# Patient Record
Sex: MALE | Race: WHITE | Employment: FULL TIME | URBAN - METROPOLITAN AREA
[De-identification: names, ages, dates, MRNs, and addresses within clinical notes are randomized per-mention and may not be internally consistent; named-entity substitution may affect disease eponyms.]

---

## 2019-01-11 ENCOUNTER — HOSPITAL ENCOUNTER (OUTPATIENT)
Age: 49
Discharge: HOME | End: 2019-01-13
Payer: COMMERCIAL

## 2019-01-11 ENCOUNTER — HOSPITAL ENCOUNTER (OUTPATIENT)
Dept: GENERAL RADIOLOGY | Age: 49
Discharge: HOME | End: 2019-01-13
Payer: COMMERCIAL

## 2019-01-11 DIAGNOSIS — N20.0 CALCULUS OF KIDNEY: ICD-10-CM

## 2019-01-11 LAB — PROSTATE SPECIFIC ANTIGEN: 1.23 NG/ML (ref 0–4)

## 2019-01-11 PROCEDURE — 74018 RADEX ABDOMEN 1 VIEW: CPT

## 2019-01-11 PROCEDURE — G0103 PSA SCREENING: HCPCS

## 2020-01-22 ENCOUNTER — HOSPITAL ENCOUNTER (OUTPATIENT)
Age: 50
Discharge: HOME | End: 2020-01-24
Payer: COMMERCIAL

## 2020-01-22 LAB — PROSTATE SPECIFIC ANTIGEN: 1.18 NG/ML (ref 0–4)

## 2020-01-22 PROCEDURE — G0103 PSA SCREENING: HCPCS

## 2020-11-13 ENCOUNTER — OFFICE VISIT (OUTPATIENT)
Dept: CHIROPRACTIC MEDICINE | Age: 50
End: 2020-11-13
Payer: COMMERCIAL

## 2020-11-13 VITALS — TEMPERATURE: 98.2 F | OXYGEN SATURATION: 97 % | HEART RATE: 72 BPM

## 2020-11-13 PROCEDURE — G8428 CUR MEDS NOT DOCUMENT: HCPCS | Performed by: CHIROPRACTOR

## 2020-11-13 PROCEDURE — G8421 BMI NOT CALCULATED: HCPCS | Performed by: CHIROPRACTOR

## 2020-11-13 PROCEDURE — 1036F TOBACCO NON-USER: CPT | Performed by: CHIROPRACTOR

## 2020-11-13 PROCEDURE — G8484 FLU IMMUNIZE NO ADMIN: HCPCS | Performed by: CHIROPRACTOR

## 2020-11-13 PROCEDURE — 99212 OFFICE O/P EST SF 10 MIN: CPT | Performed by: CHIROPRACTOR

## 2020-11-13 NOTE — PROGRESS NOTES
Daniel Alarcon : [birth year] Sex: male  Age: 52 y.o. Chief Complaint   Patient presents with    Hand Pain     right       HPI:   Right hand pain  Has been several months since I last saw Justin Alberts. He has been doing well in the interim. Will have some mild neck or back symptoms which he is able to take care of himself. However approximately 2 weeks ago he was lifting and moving some boxes when he felt immediate pain identified near the fourth digit right hand MCP joint. He denies any immediate swelling. He states he still seems to have full function of the hand, but he will get some tingling in various parts of his hand. He also notices the tingling in the morning upon waking. States he will shake it out and it goes away. He does have some symptoms traveling to the forearm, but nothing proximal to the elbow. Denies any neck pain. He believes that this fourth digit may be the one that he injured a couple of years ago, believes he jammed it doing some exercising/wall ball and wore a splint for a few days with relief. Current Outpatient Medications:     brimonidine (ALPHAGAN) 0.2 % ophthalmic solution, INSTILL 1 GTT INTO OU Q 12 H, Disp: , Rfl: 3    LUMIGAN 0.01 % SOLN ophthalmic drops, INSTILL 1 GTT INTO OU Q NIGHT, Disp: , Rfl: 3    allopurinol (ZYLOPRIM) 100 MG tablet, Take 100 mg by mouth daily. , Disp: , Rfl:     Exam:   Vitals:    20 0916   Pulse: 72   Temp: 98.2 °F (36.8 °C)   SpO2: 97%     He appears well. No apparent distress. Alert and oriented x3. Cervical active range of motion are well-preserved. His no midline cervical pain. Cervical compression, distraction, foraminal compression and maximum cervical rotatory compression testing are all negative. Right arm squeeze testing reproduces some mild symptoms into the hand. Phalen's and reverse Phalen's are both negative. He is neurovascularly intact to the upper extremities. Momo's and Tromner's are absent.   He displays full active motion of the digits of the right hand and wrist.  Resisted flexion of the fourth digit causes immediate pain and swelling near the MCP joint and into the palmar surface of the right hand. Jackson Goldman was seen today for hand pain. Diagnoses and all orders for this visit:    Pain in joint of right hand  -     XR HAND RIGHT (MIN 3 VIEWS); Future  -     External Referral To Orthopedic Surgery        Treatment Plan: Ordering x-rays of his right hand today to rule out any underlying fracture. However I believe this is an acute tendinopathy involving the finger flexor (FDP). Recommend he see a hand specialist for further evaluation. He has a pre existing relationship with Dr. Cyril Cevallos at [facility], so I will refer him there at his request.      Xray results pending.   Will contact him with results when available      Seen By:  Claudette Andrews

## 2020-12-08 ENCOUNTER — OFFICE VISIT (OUTPATIENT)
Dept: CHIROPRACTIC MEDICINE | Age: 50
End: 2020-12-08
Payer: COMMERCIAL

## 2020-12-08 VITALS — HEART RATE: 71 BPM | TEMPERATURE: 96.5 F | OXYGEN SATURATION: 98 % | RESPIRATION RATE: 16 BRPM

## 2020-12-08 PROCEDURE — G0283 ELEC STIM OTHER THAN WOUND: HCPCS | Performed by: CHIROPRACTOR

## 2020-12-08 PROCEDURE — 98940 CHIROPRACT MANJ 1-2 REGIONS: CPT | Performed by: CHIROPRACTOR

## 2020-12-08 NOTE — PROGRESS NOTES
Daniel Alarcon :  Sex: male  Age: 52 y.o. Chief Complaint   Patient presents with    Neck Pain       HPI:   Zoë Lockwood returns today. He did see Dr. Polina Santacruz regarding his hand. Diagnosed with a sprain of the finger. Also noted some carpal tunnel syndrome of the wrist which they are working up. Today, he is having some left-sided neck pain. No injury. It has not prevented his activities. He started doing his HEP exercises again and they seem to help. He is better when he is active or at the gym. Worse in the evenings. He does not have any true symptoms down the arm. But, he still gets some tingling in his fingertips at times. But no continuous pain from the neck down the arm. Current Outpatient Medications:     brimonidine (ALPHAGAN) 0.2 % ophthalmic solution, INSTILL 1 GTT INTO OU Q 12 H, Disp: , Rfl: 3    LUMIGAN 0.01 % SOLN ophthalmic drops, INSTILL 1 GTT INTO OU Q NIGHT, Disp: , Rfl: 3    allopurinol (ZYLOPRIM) 100 MG tablet, Take 100 mg by mouth daily. , Disp: , Rfl:     Exam:   Vitals:    20 1320   Pulse: 71   Resp: 16   Temp: 96.5 °F (35.8 °C)   SpO2: 98%     He appears well. No apparent distress. Alert and oriented x3. Cervical active range of motion are mildly limited only in all planes. He does have some endrange discomfort in bilateral rotation. He is neurovascularly intact throughout the upper extremities. Momo's and Tromner's are absent. Cervical compression reproduces axial neck pain only. Foraminal compression testing reproduces axial neck pain as well bilaterally without radiation of symptoms to the extremities. Maximum cervical rotatory compression testing is negative bilaterally. Neurovascular compression testing and arm squeeze tests are negative bilaterally. The neck is supple and nontender in the midline. There are hypertonic and tender fibers noted today in the cervical and thoracic paraspinal muscles.   Trigger point the left trapezius today. Joint fixation is noted with motion screening at T2-4. Jonn Simons was seen today for neck pain. Diagnoses and all orders for this visit:    Cervicalgia    Cervical radiculopathy at C6        Treatment Plan: He is scheduled for an EMG tomorrow regarding the carpal tunnel syndrome. This will also help us differentiate true cervical radiculopathy from scleratogenous pain. Today, EMS with heat to the cervical region for 15 minutes to address muscle spasm/hypertension and alleviate pain. Diversified manipulation to the affected segments. Tolerated well. We will see what the EMG shows tomorrow. Proceed from there.         Seen By:  Bettye Zaldivar DC

## 2020-12-11 ENCOUNTER — OFFICE VISIT (OUTPATIENT)
Dept: CHIROPRACTIC MEDICINE | Age: 50
End: 2020-12-11
Payer: COMMERCIAL

## 2020-12-11 VITALS
RESPIRATION RATE: 16 BRPM | HEART RATE: 67 BPM | TEMPERATURE: 97.2 F | WEIGHT: 190 LBS | OXYGEN SATURATION: 97 % | HEIGHT: 70 IN | BODY MASS INDEX: 27.2 KG/M2

## 2020-12-11 PROCEDURE — 98940 CHIROPRACT MANJ 1-2 REGIONS: CPT | Performed by: CHIROPRACTOR

## 2020-12-11 PROCEDURE — 99999 PR OFFICE/OUTPT VISIT,PROCEDURE ONLY: CPT | Performed by: CHIROPRACTOR

## 2020-12-11 PROCEDURE — G0283 ELEC STIM OTHER THAN WOUND: HCPCS | Performed by: CHIROPRACTOR

## 2020-12-11 NOTE — PROGRESS NOTES
Daniel Alarcon :  Sex: male  Age: 52 y.o. Chief Complaint   Patient presents with    Neck Pain       HPI:   Pain is has slightly improved. On average, pain is perceived as moderate (4 pain scale). He did have an EMG the other day which showed right-sided carpal tunnel syndrome, but no issues in the left. States that spot in the left side of his neck is still there but doing better. He will occasionally have some spasming or pain near the left elbow or biceps area, but nothing distal.        Current Outpatient Medications:     brimonidine (ALPHAGAN) 0.2 % ophthalmic solution, INSTILL 1 GTT INTO OU Q 12 H, Disp: , Rfl: 3    LUMIGAN 0.01 % SOLN ophthalmic drops, INSTILL 1 GTT INTO OU Q NIGHT, Disp: , Rfl: 3    allopurinol (ZYLOPRIM) 100 MG tablet, Take 100 mg by mouth daily. , Disp: , Rfl:     Exam:   Vitals:    20 0947   Pulse: 67   Resp: 16   Temp: 97.2 °F (36.2 °C)   SpO2: 97%       There is no midline pain. He does have tenderness over the first rib on the left. Some muscle spasming here as well. There are hypertonic and tender fibers noted today in the cervical and thoracic paraspinal muscles. Joint fixation is noted with motion screening at C7-T1, T2-4. Seng Comment was seen today for neck pain. Diagnoses and all orders for this visit:    Cervicalgia    Pain in joint of right hand    Panniculitis affecting regions of neck and back, thoracic region        Treatment Plan:  EMS with heat to the cervicothoracic region for 15 minutes to address muscle spasm/hypertension and alleviate pain. Diversified manipulation of the listed segments. Tolerated well. Introduced cervical extension mobilization using a towel today. Wanted to try 1-2 sets of 10/day and see if this changes anything. He should stop should symptoms worsen. Otherwise keep up with his HEP and exercising as he wishes.   I will see him back next week for further care      Seen By:  Sara Roberts

## 2020-12-17 ENCOUNTER — OFFICE VISIT (OUTPATIENT)
Dept: CHIROPRACTIC MEDICINE | Age: 50
End: 2020-12-17
Payer: COMMERCIAL

## 2020-12-17 VITALS
HEART RATE: 80 BPM | TEMPERATURE: 97.8 F | HEIGHT: 70 IN | RESPIRATION RATE: 14 BRPM | BODY MASS INDEX: 27.2 KG/M2 | OXYGEN SATURATION: 97 % | WEIGHT: 190 LBS

## 2020-12-17 PROCEDURE — 97140 MANUAL THERAPY 1/> REGIONS: CPT | Performed by: CHIROPRACTOR

## 2020-12-17 PROCEDURE — G0283 ELEC STIM OTHER THAN WOUND: HCPCS | Performed by: CHIROPRACTOR

## 2020-12-17 NOTE — PROGRESS NOTES
Daniel Alarcon :  Sex: male  Age: 52 y.o. Chief Complaint   Patient presents with    Neck Pain       HPI:   Overall he is doing better. But he still has times where the neck pain will bother him and travel to the left lateral upper arm, biceps region. This particularly happens when he extends his neck backwards. He states sometimes when he does his neck exercises he will get it to the left arm as well. It has not limited his ability to perform tasks, exercise, etc.    He does see Dr. Devon Webster next week for follow-up of the right wrist/hand pain. Current Outpatient Medications:     brimonidine (ALPHAGAN) 0.2 % ophthalmic solution, INSTILL 1 GTT INTO OU Q 12 H, Disp: , Rfl: 3    LUMIGAN 0.01 % SOLN ophthalmic drops, INSTILL 1 GTT INTO OU Q NIGHT, Disp: , Rfl: 3    allopurinol (ZYLOPRIM) 100 MG tablet, Take 100 mg by mouth daily. , Disp: , Rfl:     Exam:   Vitals:    20 0846   Pulse: 80   Resp: 14   Temp: 97.8 °F (36.6 °C)   SpO2: 97%       Cervical extension and extension with overpressure increases left upper arm symptoms. As does maximum cervical rotatory compression testing to the left. No midline cervical pain. There are hypertonic and tender fibers noted today in the cervical and thoracic paraspinal muscles. Trigger points left trapezius, left scalenus medius    Yesy Karena was seen today for neck pain. Diagnoses and all orders for this visit:    Cervical radiculopathy at C6    Panniculitis affecting regions of neck and back, thoracic region    Muscle spasm of back        Treatment Plan:  EMS with heat to the cervicothoracic region for 15 minutes to address muscle spasm/hypertension and alleviate pain. Manual therapy to the cervicothoracic region using techniques including IASTM (JOHN), trigger point therapy were performed today for 12 minutes.   9:07-919am Discussed physical medicine visithe states since he is getting better he would like to try a few more visits before we go down the road again.       Seen By:  Jenny Lorenz DC

## 2020-12-22 ENCOUNTER — OFFICE VISIT (OUTPATIENT)
Dept: CHIROPRACTIC MEDICINE | Age: 50
End: 2020-12-22
Payer: COMMERCIAL

## 2020-12-22 VITALS
HEART RATE: 71 BPM | OXYGEN SATURATION: 97 % | HEIGHT: 70 IN | RESPIRATION RATE: 14 BRPM | TEMPERATURE: 97.5 F | BODY MASS INDEX: 27.2 KG/M2 | WEIGHT: 190 LBS

## 2020-12-22 PROCEDURE — G0283 ELEC STIM OTHER THAN WOUND: HCPCS | Performed by: CHIROPRACTOR

## 2020-12-22 PROCEDURE — 97140 MANUAL THERAPY 1/> REGIONS: CPT | Performed by: CHIROPRACTOR

## 2020-12-22 NOTE — PROGRESS NOTES
Daniel Alarcon :  Sex: male  Age: 52 y.o. Chief Complaint   Patient presents with    Neck Pain       HPI:   Pain is has improved. Pain is mild in the left cervicothoracic region, but he still gets some ill-defined complaints into the left biceps. He has no new symptoms today. He has been doing his exercises, and notes that at times it will make his arm symptoms more prominent. Current Outpatient Medications:     brimonidine (ALPHAGAN) 0.2 % ophthalmic solution, INSTILL 1 GTT INTO OU Q 12 H, Disp: , Rfl: 3    LUMIGAN 0.01 % SOLN ophthalmic drops, INSTILL 1 GTT INTO OU Q NIGHT, Disp: , Rfl: 3    allopurinol (ZYLOPRIM) 100 MG tablet, Take 100 mg by mouth daily. , Disp: , Rfl:     Exam:   Vitals:    20 1130   Pulse: 71   Resp: 14   Temp: 97.5 °F (36.4 °C)   SpO2: 97%       Cervical extension is mildly limited with increased symptoms into the left upper extremity. Lateral flexion well-preserved bilaterally. Cervical flexion is minimally limited, again reproducing symptoms into the left upper extremity. Cervical compression and foraminal compression tests are both negative today. Cervical distraction testing provides immediate relief of symptoms. There are hypertonic and tender fibers noted today in the cervical paraspinal muscles. Simona Ash was seen today for neck pain. Diagnoses and all orders for this visit:    Cervical radiculopathy at C6    Muscle spasm of back        Treatment Plan:  EMS with heat to the cervical region for 11minutes to address muscle spasm/hypertension and alleviate pain. Performed in a seated position today. Manual therapy to the cervical region using techniques including manual distraction, and muscle energy techniques were performed today for 10 minutes.   11:50 AM-12:00 Going to stop his MDT home exercises at this point. If he wants to do the cat-camel or any other exercises, strength training he can. I want him to avoid cervical extension and flexion at this point for the next week or so. I will have him follow-up with me in early January for further evaluation and treatment. Continues to note improvement.   If things worsen we will update some imaging and consider pain management      Seen By:  Genia Leventhal

## 2021-01-06 ENCOUNTER — OFFICE VISIT (OUTPATIENT)
Dept: CHIROPRACTIC MEDICINE | Age: 51
End: 2021-01-06
Payer: COMMERCIAL

## 2021-01-06 VITALS
BODY MASS INDEX: 27.2 KG/M2 | HEART RATE: 80 BPM | RESPIRATION RATE: 16 BRPM | OXYGEN SATURATION: 97 % | WEIGHT: 190 LBS | HEIGHT: 70 IN | TEMPERATURE: 97.8 F

## 2021-01-06 DIAGNOSIS — M54.04 PANNICULITIS AFFECTING REGIONS OF NECK AND BACK, THORACIC REGION: ICD-10-CM

## 2021-01-06 DIAGNOSIS — M54.12 CERVICAL RADICULOPATHY AT C6: Primary | ICD-10-CM

## 2021-01-06 DIAGNOSIS — M62.830 MUSCLE SPASM OF BACK: ICD-10-CM

## 2021-01-06 PROCEDURE — 98940 CHIROPRACT MANJ 1-2 REGIONS: CPT | Performed by: CHIROPRACTOR

## 2021-01-06 PROCEDURE — G0283 ELEC STIM OTHER THAN WOUND: HCPCS | Performed by: CHIROPRACTOR

## 2021-01-06 NOTE — PROGRESS NOTES
26  Daniel Alarcon :  Sex: male  Age: 52 y.o. Chief Complaint   Patient presents with    Neck Pain       HPI:   Pain is has improved. On average, pain is perceived as mild (1-3  pain scale). He has refrained from the cervical extension exercises and that seems to be helping. He states he still not 100% back to normal, but feels he is in the 90 percentile somewhere. Does still occasionally get some numbness and tingling in the right hand. He has followed up with orthopedicsno surgery recommended for carpal tunnel. No other treatment for carpal tunnel at this point. He was told if he wanted surgery in the future, he could contact them and they would proceed. Happy with his progress regarding his neck. Current Outpatient Medications:     brimonidine (ALPHAGAN) 0.2 % ophthalmic solution, INSTILL 1 GTT INTO OU Q 12 H, Disp: , Rfl: 3    LUMIGAN 0.01 % SOLN ophthalmic drops, INSTILL 1 GTT INTO OU Q NIGHT, Disp: , Rfl: 3    allopurinol (ZYLOPRIM) 100 MG tablet, Take 100 mg by mouth daily. , Disp: , Rfl:     Exam:   Vitals:    21 0907   Pulse: 80   Resp: 16   Temp: 97.8 °F (36.6 °C)   SpO2: 97%     Cervical compression, distraction are negative and maximum rotatory compression tests are negative bilaterally. Cervical extension is still somewhat limited with endrange pain. Arm squeeze testing is negative. No provocation with neurovascular compression in the supraclavicular fossa. The neck is supple and nontender in the midline. Trigger points bilateral trapezius, left levator scapula    There are hypertonic and tender fibers noted today in the cervical and thoracic paraspinal muscles. Joint fixation is noted with motion screening at C6-7. Cathy Meza was seen today for neck pain.     Diagnoses and all orders for this visit:    Cervical radiculopathy at C6    Muscle spasm of back    Panniculitis affecting regions of neck and back, thoracic region Treatment Plan:  EMS with heat to the cervical region for 15 minutes to address muscle spasm/hypertension and alleviate pain. Performed in a seated position today. Then, long axis distraction cervical spine 3 x 15 seconds and diversified manipulation of the affected segment. Tolerated well. At this point, I want him to refrain from the cervical extension exercises and quadruped position. Refrain from active cervical extension for the next couple of weeks, unless its during his normal ADLs. He does not specifically need to do any exercises in this range of motion. I wanted to see how he does over the 2 weeks and let me know. Should symptoms persist, consider physical medicine versus PT.         Seen By:  Huyen Watson DC

## 2021-02-05 ENCOUNTER — HOSPITAL ENCOUNTER (OUTPATIENT)
Age: 51
Discharge: HOME | End: 2021-02-05
Payer: COMMERCIAL

## 2021-02-05 ENCOUNTER — HOSPITAL ENCOUNTER (OUTPATIENT)
Dept: GENERAL RADIOLOGY | Age: 51
Discharge: HOME | End: 2021-02-07
Payer: COMMERCIAL

## 2021-02-05 ENCOUNTER — HOSPITAL ENCOUNTER (OUTPATIENT)
Age: 51
Discharge: HOME | End: 2021-02-07
Payer: COMMERCIAL

## 2021-02-05 DIAGNOSIS — N20.0 KIDNEY STONE: ICD-10-CM

## 2021-02-05 LAB — PROSTATE SPECIFIC ANTIGEN: 1.48 NG/ML (ref 0–4)

## 2021-02-05 PROCEDURE — G0103 PSA SCREENING: HCPCS

## 2021-02-05 PROCEDURE — 36415 COLL VENOUS BLD VENIPUNCTURE: CPT

## 2021-02-05 PROCEDURE — 74018 RADEX ABDOMEN 1 VIEW: CPT

## 2021-03-02 ENCOUNTER — IMMUNIZATION (OUTPATIENT)
Dept: PRIMARY CARE CLINIC | Age: 51
End: 2021-03-02
Payer: COMMERCIAL

## 2021-03-02 PROCEDURE — 0001A COVID-19, PFIZER VACCINE 30MCG/0.3ML DOSE: CPT | Performed by: PHYSICIAN ASSISTANT

## 2021-03-02 PROCEDURE — 91300 COVID-19, PFIZER VACCINE 30MCG/0.3ML DOSE: CPT | Performed by: PHYSICIAN ASSISTANT

## 2021-03-31 ENCOUNTER — IMMUNIZATION (OUTPATIENT)
Dept: PRIMARY CARE CLINIC | Age: 51
End: 2021-03-31
Payer: COMMERCIAL

## 2021-03-31 PROCEDURE — 0002A COVID-19, PFIZER VACCINE 30MCG/0.3ML DOSE: CPT | Performed by: PHYSICIAN ASSISTANT

## 2021-03-31 PROCEDURE — 91300 COVID-19, PFIZER VACCINE 30MCG/0.3ML DOSE: CPT | Performed by: PHYSICIAN ASSISTANT

## 2021-05-07 ENCOUNTER — HOSPITAL ENCOUNTER (EMERGENCY)
Age: 51
Discharge: HOME | End: 2021-05-07
Attending: EMERGENCY MEDICINE
Payer: COMMERCIAL

## 2021-05-07 VITALS
BODY MASS INDEX: 27.92 KG/M2 | DIASTOLIC BLOOD PRESSURE: 87 MMHG | SYSTOLIC BLOOD PRESSURE: 140 MMHG | WEIGHT: 195 LBS | TEMPERATURE: 97.6 F | RESPIRATION RATE: 18 BRPM | OXYGEN SATURATION: 97 % | HEART RATE: 70 BPM | HEIGHT: 70 IN

## 2021-05-07 DIAGNOSIS — H81.399 PERIPHERAL VERTIGO, UNSPECIFIED LATERALITY: Primary | ICD-10-CM

## 2021-05-07 LAB
ANION GAP SERPL CALCULATED.3IONS-SCNC: 8 MMOL/L (ref 7–16)
BASOPHILS ABSOLUTE: 0.04 E9/L (ref 0–0.2)
BASOPHILS RELATIVE PERCENT: 0.5 % (ref 0–2)
BUN BLDV-MCNC: 17 MG/DL (ref 6–20)
CALCIUM SERPL-MCNC: 9.5 MG/DL (ref 8.6–10.2)
CHLORIDE BLD-SCNC: 105 MMOL/L (ref 98–107)
CO2: 27 MMOL/L (ref 22–29)
CREAT SERPL-MCNC: 1 MG/DL (ref 0.7–1.2)
EKG ATRIAL RATE: 64 BPM
EKG P AXIS: 46 DEGREES
EKG P-R INTERVAL: 196 MS
EKG Q-T INTERVAL: 430 MS
EKG QRS DURATION: 88 MS
EKG QTC CALCULATION (BAZETT): 443 MS
EKG R AXIS: 44 DEGREES
EKG T AXIS: 29 DEGREES
EKG VENTRICULAR RATE: 64 BPM
EOSINOPHILS ABSOLUTE: 0.27 E9/L (ref 0.05–0.5)
EOSINOPHILS RELATIVE PERCENT: 3.3 % (ref 0–6)
GFR AFRICAN AMERICAN: >60
GFR NON-AFRICAN AMERICAN: >60 ML/MIN/1.73
GLUCOSE BLD-MCNC: 116 MG/DL (ref 74–99)
HCT VFR BLD CALC: 46 % (ref 37–54)
HEMOGLOBIN: 15.7 G/DL (ref 12.5–16.5)
IMMATURE GRANULOCYTES #: 0.1 E9/L
IMMATURE GRANULOCYTES %: 1.2 % (ref 0–5)
LYMPHOCYTES ABSOLUTE: 1.11 E9/L (ref 1.5–4)
LYMPHOCYTES RELATIVE PERCENT: 13.4 % (ref 20–42)
MAGNESIUM: 1.9 MG/DL (ref 1.6–2.6)
MCH RBC QN AUTO: 28.8 PG (ref 26–35)
MCHC RBC AUTO-ENTMCNC: 34.1 % (ref 32–34.5)
MCV RBC AUTO: 84.4 FL (ref 80–99.9)
MONOCYTES ABSOLUTE: 0.41 E9/L (ref 0.1–0.95)
MONOCYTES RELATIVE PERCENT: 5 % (ref 2–12)
NEUTROPHILS ABSOLUTE: 6.33 E9/L (ref 1.8–7.3)
NEUTROPHILS RELATIVE PERCENT: 76.6 % (ref 43–80)
PDW BLD-RTO: 12.7 FL (ref 11.5–15)
PLATELET # BLD: 238 E9/L (ref 130–450)
PMV BLD AUTO: 9.5 FL (ref 7–12)
POTASSIUM SERPL-SCNC: 4.5 MMOL/L (ref 3.5–5)
RBC # BLD: 5.45 E12/L (ref 3.8–5.8)
SODIUM BLD-SCNC: 140 MMOL/L (ref 132–146)
WBC # BLD: 8.3 E9/L (ref 4.5–11.5)

## 2021-05-07 PROCEDURE — 36415 COLL VENOUS BLD VENIPUNCTURE: CPT

## 2021-05-07 PROCEDURE — 80048 BASIC METABOLIC PNL TOTAL CA: CPT

## 2021-05-07 PROCEDURE — 93005 ELECTROCARDIOGRAM TRACING: CPT | Performed by: EMERGENCY MEDICINE

## 2021-05-07 PROCEDURE — 99283 EMERGENCY DEPT VISIT LOW MDM: CPT

## 2021-05-07 PROCEDURE — 6360000002 HC RX W HCPCS: Performed by: EMERGENCY MEDICINE

## 2021-05-07 PROCEDURE — 2580000003 HC RX 258: Performed by: EMERGENCY MEDICINE

## 2021-05-07 PROCEDURE — 85025 COMPLETE CBC W/AUTO DIFF WBC: CPT

## 2021-05-07 PROCEDURE — 96374 THER/PROPH/DIAG INJ IV PUSH: CPT

## 2021-05-07 PROCEDURE — 6370000000 HC RX 637 (ALT 250 FOR IP): Performed by: EMERGENCY MEDICINE

## 2021-05-07 PROCEDURE — 93010 ELECTROCARDIOGRAM REPORT: CPT | Performed by: INTERNAL MEDICINE

## 2021-05-07 PROCEDURE — 83735 ASSAY OF MAGNESIUM: CPT

## 2021-05-07 RX ORDER — ONDANSETRON 2 MG/ML
4 INJECTION INTRAMUSCULAR; INTRAVENOUS ONCE
Status: COMPLETED | OUTPATIENT
Start: 2021-05-07 | End: 2021-05-07

## 2021-05-07 RX ORDER — ONDANSETRON 8 MG/1
8 TABLET, ORALLY DISINTEGRATING ORAL EVERY 8 HOURS PRN
Qty: 12 TABLET | Refills: 1 | Status: SHIPPED | OUTPATIENT
Start: 2021-05-07

## 2021-05-07 RX ORDER — 0.9 % SODIUM CHLORIDE 0.9 %
1000 INTRAVENOUS SOLUTION INTRAVENOUS ONCE
Status: COMPLETED | OUTPATIENT
Start: 2021-05-07 | End: 2021-05-07

## 2021-05-07 RX ORDER — MECLIZINE HCL 12.5 MG/1
25 TABLET ORAL ONCE
Status: COMPLETED | OUTPATIENT
Start: 2021-05-07 | End: 2021-05-07

## 2021-05-07 RX ORDER — MECLIZINE HYDROCHLORIDE 25 MG/1
25 TABLET ORAL 3 TIMES DAILY PRN
Qty: 15 TABLET | Refills: 0 | Status: SHIPPED | OUTPATIENT
Start: 2021-05-07 | End: 2021-05-17

## 2021-05-07 RX ADMIN — MECLIZINE 25 MG: 12.5 TABLET ORAL at 09:33

## 2021-05-07 RX ADMIN — ONDANSETRON 4 MG: 2 INJECTION INTRAMUSCULAR; INTRAVENOUS at 09:33

## 2021-05-07 RX ADMIN — SODIUM CHLORIDE 1000 ML: 9 INJECTION, SOLUTION INTRAVENOUS at 09:33

## 2021-05-07 NOTE — ED PROVIDER NOTES
HPI:  5/7/21,   Time: 9:10 AM EDT       Naila Moya is a 54 y.o. male presenting to the ED for dizziness, beginning 3 days ago. The complaint has been intermittent, moderate in severity, and worsened by movement of head/body. Hx same, last episode 2 years ago. Better with rest.  Nausea w/o emesis. No diarrhea/abd pain/cp/cough/congestoin/fever/chills/sweats. States feels like room is moving when he walks. Worse today so came in for eval, also c/o left ear fullness    Review of Systems:   Pertinent positives and negatives are stated within HPI, all other systems reviewed and are negative.          --------------------------------------------- PAST HISTORY ---------------------------------------------  Past Medical History:  has a past medical history of Gout and Kidney stone. Past Surgical History:  has a past surgical history that includes Lithotripsy. Social History:  reports that he has never smoked. He has never used smokeless tobacco. He reports current alcohol use. He reports that he does not use drugs. Family History: family history is not on file. The patients home medications have been reviewed. Allergies: Sulfa antibiotics        ---------------------------------------------------PHYSICAL EXAM--------------------------------------    Constitutional/General: Alert and oriented x3, well appearing, non toxic in NAD  Head: Normocephalic and atraumatic, tms nml adebayo  Eyes: PERRL, EOMI, conjunctive normal, sclera non icteric  Mouth: Oropharynx clear, handling secretions, no trismus, no asymmetry of the posterior oropharynx or uvular edema  Neck: Supple, full ROM,   Respiratory: Not in respiratory distress  Cardiovascular:   2+ distal pulses  Chest: No chest wall tenderness  GI:  Abdomen Soft, Non tender, Non distended. Musculoskeletal: Moves all extremities x 4. Warm and well perfused, no clubbing, cyanosis, or edema. Capillary refill <3 seconds  Integument: skin warm and dry.  No rashes. Lymphatic: no lymphadenopathy noted  Neurologic: GCS 15, no focal deficits, symmetric strength 5/5 in the upper and lower extremities bilaterally, cn2-12 intact, nml cerebellar function with ftn and heel to shin, sensory intact grossly  Psychiatric: Normal Affect    -------------------------------------------------- RESULTS -------------------------------------------------  I have personally reviewed all laboratory and imaging results for this patient. Results are listed below.      LABS:  Results for orders placed or performed during the hospital encounter of 05/07/21   CBC Auto Differential   Result Value Ref Range    WBC 8.3 4.5 - 11.5 E9/L    RBC 5.45 3.80 - 5.80 E12/L    Hemoglobin 15.7 12.5 - 16.5 g/dL    Hematocrit 46.0 37.0 - 54.0 %    MCV 84.4 80.0 - 99.9 fL    MCH 28.8 26.0 - 35.0 pg    MCHC 34.1 32.0 - 34.5 %    RDW 12.7 11.5 - 15.0 fL    Platelets 118 190 - 637 E9/L    MPV 9.5 7.0 - 12.0 fL    Neutrophils % 76.6 43.0 - 80.0 %    Immature Granulocytes % 1.2 0.0 - 5.0 %    Lymphocytes % 13.4 (L) 20.0 - 42.0 %    Monocytes % 5.0 2.0 - 12.0 %    Eosinophils % 3.3 0.0 - 6.0 %    Basophils % 0.5 0.0 - 2.0 %    Neutrophils Absolute 6.33 1.80 - 7.30 E9/L    Immature Granulocytes # 0.10 E9/L    Lymphocytes Absolute 1.11 (L) 1.50 - 4.00 E9/L    Monocytes Absolute 0.41 0.10 - 0.95 E9/L    Eosinophils Absolute 0.27 0.05 - 0.50 E9/L    Basophils Absolute 0.04 0.00 - 0.20 J8/S   Basic metabolic panel   Result Value Ref Range    Sodium 140 132 - 146 mmol/L    Potassium 4.5 3.5 - 5.0 mmol/L    Chloride 105 98 - 107 mmol/L    CO2 27 22 - 29 mmol/L    Anion Gap 8 7 - 16 mmol/L    Glucose 116 (H) 74 - 99 mg/dL    BUN 17 6 - 20 mg/dL    CREATININE 1.0 0.7 - 1.2 mg/dL    GFR Non-African American >60 >=60 mL/min/1.73    GFR African American >60     Calcium 9.5 8.6 - 10.2 mg/dL   Magnesium   Result Value Ref Range    Magnesium 1.9 1.6 - 2.6 mg/dL   EKG 12 Lead   Result Value Ref Range    Ventricular Rate 64 BPM Atrial Rate 64 BPM    P-R Interval 196 ms    QRS Duration 88 ms    Q-T Interval 430 ms    QTc Calculation (Bazett) 443 ms    P Axis 46 degrees    R Axis 44 degrees    T Axis 29 degrees       RADIOLOGY:  Interpreted by Radiologist.  No orders to display       EKG: This EKG is signed and interpreted by the EP. Time: 919  Rate: 65  Rhythm: Sinus  Interpretation: non-specific EKG  Comparison: None      ------------------------- NURSING NOTES AND VITALS REVIEWED ---------------------------   The nursing notes within the ED encounter and vital signs as below have been reviewed by myself. BP (!) 140/87   Pulse 70   Temp 97.6 °F (36.4 °C)   Resp 18   Ht 5' 10\" (1.778 m)   Wt 195 lb (88.5 kg)   SpO2 97%   BMI 27.98 kg/m²   Oxygen Saturation Interpretation: Normal    The patients available past medical records and past encounters were reviewed. ------------------------------ ED COURSE/MEDICAL DECISION MAKING----------------------  Medications   meclizine (ANTIVERT) tablet 25 mg (25 mg Oral Given 5/7/21 0933)   0.9 % sodium chloride bolus (0 mLs Intravenous Stopped 5/7/21 1015)   ondansetron (ZOFRAN) injection 4 mg (4 mg Intravenous Given 5/7/21 0933)         ED COURSE:       Medical Decision Making:    Neuro exam nml, no cerebellar deficit, clinically periph vertigo, tx supp with outpt fu          This patient's ED course included: a personal history and physicial examination    This patient has remained hemodynamically stable during their ED course. Re-Evaluations:             Re-evaluation. Patients symptoms are improving            Counseling: The emergency provider has spoken with the patient and discussed todays results, in addition to providing specific details for the plan of care and counseling regarding the diagnosis and prognosis.   Questions are answered at this time and they are agreeable with the plan.       --------------------------------- IMPRESSION AND DISPOSITION ---------------------------------    IMPRESSION  1. Peripheral vertigo, unspecified laterality        DISPOSITION  Disposition: Discharge to home  Patient condition is stable    NOTE: This report was transcribed using voice recognition software.  Every effort was made to ensure accuracy; however, inadvertent computerized transcription errors may be present        Ninfa Huffman MD  05/07/21 [id number]

## 2022-01-18 ENCOUNTER — OFFICE VISIT (OUTPATIENT)
Dept: CHIROPRACTIC MEDICINE | Age: 52
End: 2022-01-18
Payer: COMMERCIAL

## 2022-01-18 VITALS — HEART RATE: 74 BPM | OXYGEN SATURATION: 98 % | TEMPERATURE: 97.4 F | WEIGHT: 195 LBS | BODY MASS INDEX: 27.98 KG/M2

## 2022-01-18 DIAGNOSIS — M62.830 MUSCLE SPASM OF BACK: ICD-10-CM

## 2022-01-18 DIAGNOSIS — M54.04 PANNICULITIS AFFECTING REGIONS OF NECK AND BACK, THORACIC REGION: ICD-10-CM

## 2022-01-18 DIAGNOSIS — M54.50 BILATERAL LOW BACK PAIN WITHOUT SCIATICA, UNSPECIFIED CHRONICITY: ICD-10-CM

## 2022-01-18 DIAGNOSIS — M54.12 CERVICAL RADICULOPATHY AT C6: Primary | ICD-10-CM

## 2022-01-18 PROCEDURE — 98941 CHIROPRACT MANJ 3-4 REGIONS: CPT | Performed by: CHIROPRACTOR

## 2022-01-18 PROCEDURE — 99213 OFFICE O/P EST LOW 20 MIN: CPT | Performed by: CHIROPRACTOR

## 2022-01-18 PROCEDURE — 97014 ELECTRIC STIMULATION THERAPY: CPT | Performed by: CHIROPRACTOR

## 2022-01-18 NOTE — PROGRESS NOTES
Patient is here for neck and back pain.  Vu Staley MD  Electronically signed by Alber Wright LPN on 0/28/6960 at 6:17 AM

## 2022-01-18 NOTE — PROGRESS NOTES
Daniel Alarcon :  Sex: male  Age: 54 y.o. Chief Complaint   Patient presents with    Back Pain    Neck Pain       HPI:   Tisha Hauser returns today for continued care of chronic neck pain. He was last seen approximately 1 year ago. He states that he is noticed his neck a bit more of late. Notices it most when he extends back, will give him a shot down the left arm again. States its not that bad and he can deal with it. More problematic today is his lower back. Lower back has been aching too since before renetta. Worse with extension. Tried some cobra stretches, which helped. Better now but sore, occasionally hits him when he gets up from a chair. No lower extremity involvement. His pains have not limited him physically does she still been going to the gym as he wishes. Current Outpatient Medications:     ondansetron (ZOFRAN ODT) 8 MG TBDP disintegrating tablet, Take 1 tablet by mouth every 8 hours as needed for Nausea, Disp: 12 tablet, Rfl: 1    brimonidine (ALPHAGAN) 0.2 % ophthalmic solution, INSTILL 1 GTT INTO OU Q 12 H, Disp: , Rfl: 3    LUMIGAN 0.01 % SOLN ophthalmic drops, INSTILL 1 GTT INTO OU Q NIGHT, Disp: , Rfl: 3    allopurinol (ZYLOPRIM) 100 MG tablet, Take 100 mg by mouth daily. , Disp: , Rfl:     Exam:   Vitals:    22 0844   Pulse: 74   Temp: 97.4 °F (36.3 °C)   SpO2: 98%   Appears well per no apparent distress. Alert and oriented x3. No antalgia or list.  There is mild limitation of cervical active range of motion in all planes. The neck is supple and nontender in the midline. Cervical compression, distraction both negative. Left foraminal compression and left maximum cervical rotatory compression both are positive. To the right, both negative. He is neurovascularly intact throughout the upper and lower extremities. SLRs are negative in both seated and supine positions. Kemps test negative bilaterally.     REIL x 30 --decreased/better  RFIL x 20- no effect/no effect  No midline pain. There are hypertonic and tender fibers noted today in the cervical, thoracic and lumbar paraspinal muscles. Joint fixation is noted with motion screening at C4-6, T3-5, L5-S1. Cici Cao was seen today for back pain and neck pain. Diagnoses and all orders for this visit:    Cervical radiculopathy at C6    Muscle spasm of back    Panniculitis affecting regions of neck and back, thoracic region    Bilateral low back pain without sciatica, unspecified chronicity        Treatment Plan: Spoke to him regarding his exam- though he did have some increased symptoms in the left upper extremity with testing, neurologically he is WNL. Inconclusive MDT low back today. Going to start him in some formal care to consist of EMS, CMT and HEP. EMS with heat to the lower lumbar region for 15 minutes to address muscle spasm/hypertension and alleviate pain. Diversified manipulation to the affected segments in the cervical, thoracic, lumbar regions. All tolerated well, noting relief following care. I will have him start with some HEP today which I will email him with his consent. We will increase his EDWARD L to 10 repetitions, every other waking hour for the next 2 days. He will stop should symptoms worsen. I want to see him back in 1 week for continued care.       Seen By:  Glenn Mancera DC

## 2022-01-25 ENCOUNTER — OFFICE VISIT (OUTPATIENT)
Dept: CHIROPRACTIC MEDICINE | Age: 52
End: 2022-01-25
Payer: COMMERCIAL

## 2022-01-25 VITALS — BODY MASS INDEX: 27.92 KG/M2 | OXYGEN SATURATION: 97 % | WEIGHT: 195 LBS | HEIGHT: 70 IN | HEART RATE: 73 BPM

## 2022-01-25 DIAGNOSIS — M54.50 BILATERAL LOW BACK PAIN WITHOUT SCIATICA, UNSPECIFIED CHRONICITY: ICD-10-CM

## 2022-01-25 DIAGNOSIS — M54.12 CERVICAL RADICULOPATHY AT C6: Primary | ICD-10-CM

## 2022-01-25 DIAGNOSIS — M62.830 MUSCLE SPASM OF BACK: ICD-10-CM

## 2022-01-25 DIAGNOSIS — M54.04 PANNICULITIS AFFECTING REGIONS OF NECK AND BACK, THORACIC REGION: ICD-10-CM

## 2022-01-25 PROCEDURE — 98941 CHIROPRACT MANJ 3-4 REGIONS: CPT | Performed by: CHIROPRACTOR

## 2022-01-25 PROCEDURE — 97014 ELECTRIC STIMULATION THERAPY: CPT | Performed by: CHIROPRACTOR

## 2022-01-25 PROCEDURE — 99999 PR OFFICE/OUTPT VISIT,PROCEDURE ONLY: CPT | Performed by: CHIROPRACTOR

## 2022-01-25 NOTE — PROGRESS NOTES
2/10/30  Daniel Alarcon :  Sex: male  Age: 54 y.o. Chief Complaint   Patient presents with    Back Pain    Neck Pain       HPI:   The lower back is doing a bit better. States he noticed some relief beginning the day of his last visit. Did the exercises as well which also helped. However he admits he did back off them a bit over the past few days. He is also having continued neck/upper back pain similar to previous. He has no new weakness, tingling or numbness in the extremities today      Current Outpatient Medications:     ondansetron (ZOFRAN ODT) 8 MG TBDP disintegrating tablet, Take 1 tablet by mouth every 8 hours as needed for Nausea, Disp: 12 tablet, Rfl: 1    brimonidine (ALPHAGAN) 0.2 % ophthalmic solution, INSTILL 1 GTT INTO OU Q 12 H, Disp: , Rfl: 3    LUMIGAN 0.01 % SOLN ophthalmic drops, INSTILL 1 GTT INTO OU Q NIGHT, Disp: , Rfl: 3    allopurinol (ZYLOPRIM) 100 MG tablet, Take 100 mg by mouth daily. , Disp: , Rfl:     Exam:   Vitals:    22 0845   Pulse: 73   SpO2: 97%     No midline pain in the cervical, thoracic or lumbar regions. Lumbar passive hyperextension with overpressure at L4 reduces pain. There are hypertonic and tender fibers noted today in the lumbar, thoracic and cervical paraspinal muscles. Joint fixation is noted with motion screening at L5-S1, right SI joint, C6-T1, T4-5. Lynne Maurer was seen today for back pain and neck pain. Diagnoses and all orders for this visit:    Cervical radiculopathy at C6    Muscle spasm of back    Panniculitis affecting regions of neck and back, thoracic region    Bilateral low back pain without sciatica, unspecified chronicity        Treatment Plan:  EMS with heat to the lumbar region for 15 minutes to address muscle spasm/hypertension and alleviate pain. Diversified manipulation to the listed cervical, thoracic, lumbar, pelvic segments. Tolerated well, noting relief following care.   I will want him to continue with his HEP as previously discussed. Follow-up with me next week for continued care.         Seen By:  Sara Arreguin DC

## 2022-01-25 NOTE — PROGRESS NOTES
Patient is here for follow up neck and back pain.  Karie Kay MD  Electronically signed by Meenakshi Key LPN on 6/60/0722 at 6:14 AM

## 2022-03-22 ENCOUNTER — HOSPITAL ENCOUNTER (OUTPATIENT)
Age: 52
Discharge: HOME | End: 2022-03-24
Payer: COMMERCIAL

## 2022-03-22 ENCOUNTER — HOSPITAL ENCOUNTER (OUTPATIENT)
Dept: GENERAL RADIOLOGY | Age: 52
Discharge: HOME | End: 2022-03-24
Payer: COMMERCIAL

## 2022-03-22 DIAGNOSIS — N20.0 CALCULUS OF KIDNEY: ICD-10-CM

## 2022-03-22 PROCEDURE — 74018 RADEX ABDOMEN 1 VIEW: CPT

## 2023-01-24 ENCOUNTER — APPOINTMENT (OUTPATIENT)
Dept: GENERAL RADIOLOGY | Age: 53
End: 2023-01-24
Payer: COMMERCIAL

## 2023-01-24 ENCOUNTER — HOSPITAL ENCOUNTER (EMERGENCY)
Age: 53
Discharge: HOME | End: 2023-01-24
Payer: COMMERCIAL

## 2023-01-24 VITALS
DIASTOLIC BLOOD PRESSURE: 91 MMHG | OXYGEN SATURATION: 97 % | HEART RATE: 70 BPM | RESPIRATION RATE: 16 BRPM | WEIGHT: 205 LBS | BODY MASS INDEX: 29.41 KG/M2 | TEMPERATURE: 98.2 F | SYSTOLIC BLOOD PRESSURE: 148 MMHG

## 2023-01-24 DIAGNOSIS — M95.8 OSTEOCHONDRAL DEFECT OF ANKLE: Primary | ICD-10-CM

## 2023-01-24 DIAGNOSIS — M19.90 OSTEOARTHRITIS, UNSPECIFIED OSTEOARTHRITIS TYPE, UNSPECIFIED SITE: ICD-10-CM

## 2023-01-24 PROCEDURE — 73630 X-RAY EXAM OF FOOT: CPT

## 2023-01-24 PROCEDURE — 99283 EMERGENCY DEPT VISIT LOW MDM: CPT

## 2023-01-24 PROCEDURE — 73610 X-RAY EXAM OF ANKLE: CPT

## 2023-01-24 RX ORDER — NAPROXEN 500 MG/1
500 TABLET ORAL 2 TIMES DAILY PRN
Qty: 14 TABLET | Refills: 0 | Status: SHIPPED | OUTPATIENT
Start: 2023-01-24 | End: 2023-01-31

## 2023-01-24 ASSESSMENT — PAIN - FUNCTIONAL ASSESSMENT
PAIN_FUNCTIONAL_ASSESSMENT: PREVENTS OR INTERFERES SOME ACTIVE ACTIVITIES AND ADLS
PAIN_FUNCTIONAL_ASSESSMENT: 0-10
PAIN_FUNCTIONAL_ASSESSMENT: 0-10

## 2023-01-24 ASSESSMENT — PAIN SCALES - GENERAL
PAINLEVEL_OUTOF10: 6
PAINLEVEL_OUTOF10: 6

## 2023-01-24 ASSESSMENT — PAIN DESCRIPTION - DESCRIPTORS: DESCRIPTORS: ACHING

## 2023-01-24 ASSESSMENT — PAIN DESCRIPTION - LOCATION
LOCATION: ANKLE
LOCATION: FOOT

## 2023-01-24 ASSESSMENT — PAIN DESCRIPTION - ORIENTATION
ORIENTATION: LEFT
ORIENTATION: LEFT

## 2023-01-24 ASSESSMENT — PAIN DESCRIPTION - FREQUENCY: FREQUENCY: INTERMITTENT

## 2023-01-24 ASSESSMENT — PAIN DESCRIPTION - ONSET: ONSET: ON-GOING

## 2023-01-24 ASSESSMENT — PAIN DESCRIPTION - PAIN TYPE: TYPE: ACUTE PAIN

## 2023-01-24 NOTE — Clinical Note
Lorena Bethea was seen and treated in our emergency department on 1/24/2023. He may return to work on 01/25/2023. If you have any questions or concerns, please don't hesitate to call.       Beni Bradley PA-C

## 2023-01-25 NOTE — ED PROVIDER NOTES
Independent [facility] Visit. Department of Emergency Medicine   ED  Encounter Note  Admit Date/RoomTime: 2023  7:54 PM  ED [facility]: [facility]    NAME: Reji Fatima  : [birth year]  MRN: [id number]     Chief Complaint:  Foot Pain (Today began with left heel pain, no injury known)    History of Present Illness         Reji Fatima is a 50 y.o. old male presenting to the emergency department by private vehicle, for non-traumatic Left foot pain which occured 1 day(s) prior to arrival.  The complaint is due to no known cause. Patient has no prior history of pain/injury with regards to today's visit. Since onset the symptoms have been remaining constant with ability to bear weight, but with some pain. His pain is aggraveated by any movement, pressure on or palpation of painful area, or weight bearing and relieved by nothing. He denies any head injury, headache, loss of consciousness, confusion, dizziness, neck pain, chest pain, abdominal pain, back pain, extremity injury, numbness, weakness, blurred vision, nausea, vomiting, fever, chills, wounds, or rash. ROS   Pertinent positives and negatives are stated within HPI, all other systems reviewed and are negative. Past Medical History:  has a past medical history of Gout and Kidney stone. Surgical History:  has a past surgical history that includes Lithotripsy. Social History:  reports that he has never smoked. He has never used smokeless tobacco. He reports current alcohol use. He reports that he does not use drugs. Family History: family history is not on file.      Allergies: Sulfa antibiotics    Physical Exam   Oxygen Saturation Interpretation: Normal.        ED Triage Vitals   BP Temp Temp Source Heart Rate Resp SpO2 Height Weight   23 -- 23   (!) 148/91 98.2 °F (36.8 °C) Oral 70 16 97 %  205 lb (93 kg)       Constitutional:  Alert, development consistent with age. Neck:  Normal ROM. Supple. Left Foot: under (distal) to lateral malleolus               Tenderness:  mild. Swelling: Mild. Deformity: no deformity observed/palpated. ROM: full range of motion. Skin:  no erythema, rash or wounds noted. Neurovascular: Motor deficit: none. Sensory deficit:   none. Pulse deficit: none. Capillary refill: normal.  Left Toe(s):  diffusely across all digits. Tenderness: none. Swelling: None. Deformity: no deformity observed/palpated. ROM: full range of motion. Skin:  no wounds, erythema, or swelling. Left Ankle: diffusely across ankle            Tenderness:  none. Swelling: None. Deformity: no deformity observed/palpated. ROM: full range of motion. Skin:  no wounds, erythema, or swelling. Gait:  normal.  Lymphatics: No lymphangitis or adenopathy noted. Neurological:  Oriented. Motor functions intact. Lab / Imaging Results   (All laboratory and radiology results have been personally reviewed by myself)  Labs:  No results found for this visit on 01/24/23. Imaging: All Radiology results interpreted by Radiologist unless otherwise noted. XR ANKLE LEFT (MIN 3 VIEWS)   Final Result   1. There is an osteochondral lesion in the superomedial talar dome. 2.  Mild left ankle soft tissue swelling. No acute osseous findings about   the left ankle nor left foot. 3.  Minimal left large toe metatarsal phalangeal joint osteoarthritis. Posterior calcaneal enthesophyte. Mild soft tissue prominence of the distal   Achilles tendon.       RECOMMENDATION:   In the setting of recent trauma, if there is persistent symptoms and physical   exam warrants a repeat radiograph in 10-14 days could be considered as occult   fractures may not be evident on initial imaging evaluation. XR FOOT LEFT (MIN 3 VIEWS)   Final Result   1. There is an osteochondral lesion in the superomedial talar dome. 2.  Mild left ankle soft tissue swelling. No acute osseous findings about   the left ankle nor left foot. 3.  Minimal left large toe metatarsal phalangeal joint osteoarthritis. Posterior calcaneal enthesophyte. Mild soft tissue prominence of the distal   Achilles tendon. RECOMMENDATION:   In the setting of recent trauma, if there is persistent symptoms and physical   exam warrants a repeat radiograph in 10-14 days could be considered as occult   fractures may not be evident on initial imaging evaluation. ED Course / Medical Decision Making   Medications - No data to display     Consult(s):   None. Procedure(s):  None    MDM:      Patient presents to the emergency room for left lateral ankle pain just below the left lateral malleolus. Radiographs are obtained and unremarkable for acute bony abnormality. Patient does have some mild swelling to the affected area. Patient also with osteochondral lesion in the superior medial talar dome, patient educated on all x-ray findings. Patient to follow-up with his orthopedic surgeon. Patient neurovascularly intact distally. Gait normal.  Patient offered a walker boot and he declined. Naproxen as needed for pain. Return for new or worsening symptoms. Plan of Care/Counseling:  Jn Barroso PA-C reviewed today's visit with the patient in addition to providing specific details for the plan of care and counseling regarding the diagnosis and prognosis. Questions are answered at this time and are agreeable with the plan. Assessment      1. Osteochondral defect of ankle    2. Osteoarthritis, unspecified osteoarthritis type, unspecified site      Plan   Discharged home.   Patient condition is good    New Medications     Discharge Medication List as of 1/24/2023  9:40 PM        START taking these medications Details   naproxen (NAPROSYN) 500 MG tablet Take 1 tablet by mouth 2 times daily as needed for Pain, Disp-14 tablet, R-0Normal           Electronically signed by Kelton Haynes PA-C   DD: 1/24/23  **This report was transcribed using voice recognition software. Every effort was made to ensure accuracy; however, inadvertent computerized transcription errors may be present.   END OF ED PROVIDER NOTE        Kelton Haynes PA-C  01/24/23 [id number]

## 2023-01-27 NOTE — PROGRESS NOTES
I called patient and he stated that he is already a patient at [facility] and has already followed up with them.

## 2023-03-29 ENCOUNTER — HOSPITAL ENCOUNTER (OUTPATIENT)
Dept: GENERAL RADIOLOGY | Age: 53
Discharge: HOME | End: 2023-03-31
Payer: COMMERCIAL

## 2023-03-29 ENCOUNTER — HOSPITAL ENCOUNTER (OUTPATIENT)
Age: 53
Discharge: HOME | End: 2023-03-31
Payer: COMMERCIAL

## 2023-03-29 DIAGNOSIS — N20.0 KIDNEY STONE: ICD-10-CM

## 2023-03-29 PROCEDURE — 74018 RADEX ABDOMEN 1 VIEW: CPT

## 2023-04-28 ENCOUNTER — OFFICE VISIT (OUTPATIENT)
Dept: ENT CLINIC | Age: 53
End: 2023-04-28
Payer: COMMERCIAL

## 2023-04-28 ENCOUNTER — TELEPHONE (OUTPATIENT)
Dept: ENT CLINIC | Age: 53
End: 2023-04-28

## 2023-04-28 ENCOUNTER — PROCEDURE VISIT (OUTPATIENT)
Dept: AUDIOLOGY | Age: 53
End: 2023-04-28

## 2023-04-28 VITALS
HEART RATE: 69 BPM | SYSTOLIC BLOOD PRESSURE: 128 MMHG | RESPIRATION RATE: 12 BRPM | WEIGHT: 200 LBS | OXYGEN SATURATION: 98 % | DIASTOLIC BLOOD PRESSURE: 87 MMHG | TEMPERATURE: 97.9 F | BODY MASS INDEX: 28 KG/M2 | HEIGHT: 71 IN

## 2023-04-28 DIAGNOSIS — H69.82 DYSFUNCTION OF LEFT EUSTACHIAN TUBE: Primary | ICD-10-CM

## 2023-04-28 DIAGNOSIS — H69.82 ETD (EUSTACHIAN TUBE DYSFUNCTION), LEFT: Primary | ICD-10-CM

## 2023-04-28 PROCEDURE — 99203 OFFICE O/P NEW LOW 30 MIN: CPT

## 2023-04-28 ASSESSMENT — ENCOUNTER SYMPTOMS
BACK PAIN: 0
ALLERGIC/IMMUNOLOGIC NEGATIVE: 1
SORE THROAT: 0
SINUS PRESSURE: 0
EYE PAIN: 0
EYE DISCHARGE: 0
DIARRHEA: 0
SHORTNESS OF BREATH: 0
VOMITING: 0
RHINORRHEA: 0
COUGH: 0

## 2023-04-28 NOTE — PROGRESS NOTES
This patient was referred for audiometric/tympanometric testing by EL Leggett due to eustachian tube dysfunction of left ear. .      Tympanometry revealed normal middle ear peak pressure and compliance, bilaterally. The results were reviewed with the patient and CNP. Recommendations for follow up will be made pending physician consult.     Rich Reynoso/CCC-A  [place] Lic # [id number]

## 2023-04-28 NOTE — TELEPHONE ENCOUNTER
Faxed authorization of release of records to Dr. Almaraz Mainland office. Notified patient, advised him to follow up in a week about records being sent, and that he may need to pick them up in person.   Electronically signed by Felipe Madera LPN on 2/11/9400 at 6:82 PM

## 2023-08-23 ENCOUNTER — OFFICE VISIT (OUTPATIENT)
Dept: ENT CLINIC | Age: 53
End: 2023-08-23
Payer: COMMERCIAL

## 2023-08-23 VITALS
DIASTOLIC BLOOD PRESSURE: 97 MMHG | SYSTOLIC BLOOD PRESSURE: 151 MMHG | WEIGHT: 200 LBS | HEART RATE: 76 BPM | HEIGHT: 71 IN | BODY MASS INDEX: 28 KG/M2

## 2023-08-23 DIAGNOSIS — H69.82 ETD (EUSTACHIAN TUBE DYSFUNCTION), LEFT: Primary | ICD-10-CM

## 2023-08-23 PROCEDURE — 99213 OFFICE O/P EST LOW 20 MIN: CPT

## 2023-08-23 RX ORDER — FLUTICASONE PROPIONATE 50 MCG
2 SPRAY, SUSPENSION (ML) NASAL DAILY
COMMUNITY

## 2023-08-23 ASSESSMENT — ENCOUNTER SYMPTOMS
SORE THROAT: 0
ALLERGIC/IMMUNOLOGIC NEGATIVE: 1
BACK PAIN: 0
SINUS PRESSURE: 0
EYE DISCHARGE: 0
EYE PAIN: 0
VOMITING: 0
SHORTNESS OF BREATH: 0
RHINORRHEA: 0
COUGH: 0
DIARRHEA: 0

## 2023-08-23 NOTE — PROGRESS NOTES
Subjective:      Patient ID:  Tisha Gaines is a 48 y.o. male. HPI:    Patient presents today for recheck of the left ear. Patient is  doing better. Drops:no  Pain: no  Drainage: no   Wick: no    Patient was last seen 4/28/23 for ETD. At that time his symptoms had resolved. About 2 months ago suffered from return of left ear fullness. Had called office but was scheduled further out so went to PCP. Was place on oral steroids and symptoms improved after a few days. Not having symptoms at this time. States he has been using Flonase 2 sprays each nostril daily. Past Medical History:   Diagnosis Date    Gout     Kidney stone      Past Surgical History:   Procedure Laterality Date    LITHOTRIPSY       History reviewed. No pertinent family history. Social History     Socioeconomic History    Marital status: Single     Spouse name: None    Number of children: None    Years of education: None    Highest education level: None   Tobacco Use    Smoking status: Never    Smokeless tobacco: Never   Substance and Sexual Activity    Alcohol use: Yes     Comment: weekly    Drug use: No     Allergies   Allergen Reactions    Sulfa Antibiotics      Swelling in throat         Review of Systems   Constitutional:  Negative for chills and fever. HENT:  Negative for congestion, ear discharge, ear pain, hearing loss, postnasal drip, rhinorrhea, sinus pressure, sneezing, sore throat and tinnitus. Eyes:  Negative for pain and discharge. Respiratory:  Negative for cough and shortness of breath. Cardiovascular:  Negative for chest pain. Gastrointestinal:  Negative for diarrhea and vomiting. Genitourinary:  Negative for flank pain. Musculoskeletal:  Negative for back pain and neck pain. Skin:  Negative for rash. Allergic/Immunologic: Negative. Negative for environmental allergies. Neurological:  Negative for dizziness, syncope and headaches.    All other systems reviewed and are

## 2023-12-13 ENCOUNTER — PROCEDURE VISIT (OUTPATIENT)
Dept: AUDIOLOGY | Age: 53
End: 2023-12-13
Payer: COMMERCIAL

## 2023-12-13 ENCOUNTER — OFFICE VISIT (OUTPATIENT)
Dept: ENT CLINIC | Age: 53
End: 2023-12-13
Payer: COMMERCIAL

## 2023-12-13 VITALS — WEIGHT: 200 LBS | BODY MASS INDEX: 28 KG/M2 | HEIGHT: 71 IN

## 2023-12-13 DIAGNOSIS — H69.92 ETD (EUSTACHIAN TUBE DYSFUNCTION), LEFT: Primary | ICD-10-CM

## 2023-12-13 DIAGNOSIS — H69.93 DYSFUNCTION OF BOTH EUSTACHIAN TUBES: Primary | ICD-10-CM

## 2023-12-13 PROCEDURE — 99213 OFFICE O/P EST LOW 20 MIN: CPT

## 2023-12-13 PROCEDURE — 92567 TYMPANOMETRY: CPT

## 2023-12-13 RX ORDER — FLUTICASONE PROPIONATE 50 MCG
2 SPRAY, SUSPENSION (ML) NASAL DAILY
Qty: 48 G | Refills: 3 | Status: SHIPPED | OUTPATIENT
Start: 2023-12-13

## 2023-12-13 RX ORDER — BRINZOLAMIDE/BRIMONIDINE TARTRATE 10; 2 MG/ML; MG/ML
SUSPENSION/ DROPS OPHTHALMIC
COMMUNITY
Start: 2023-11-06

## 2023-12-13 ASSESSMENT — ENCOUNTER SYMPTOMS
ALLERGIC/IMMUNOLOGIC NEGATIVE: 1
DIARRHEA: 0
VOMITING: 0
SINUS PRESSURE: 0
EYE DISCHARGE: 0
BACK PAIN: 0
COUGH: 0
SHORTNESS OF BREATH: 0
SORE THROAT: 0
EYE PAIN: 0
RHINORRHEA: 0

## 2023-12-13 NOTE — PROGRESS NOTES
This patient was referred for tympanometric testing by EL Nunez due to eustachian tube dysfunction. Tympanometry revealed normal middle ear peak pressure and compliance, bilaterally. The results were reviewed with the patient. Recommendations for follow up will be made pending physician consult.     Rich Delgado/CCC-A  [place] Lic [id number]  Electronically signed by Rich Delgado on 12/13/2023 at 10:54 AM

## 2023-12-13 NOTE — PROGRESS NOTES
Subjective:      Patient ID:  Jaci Turk is a 48 y.o. male. HPI:    Patient presents today for recheck of the left ear. Patient is  doing better. Drops:no  Pain: no  Drainage: no   Wick: no    Cerumen impaction: no    Has been seen for Left ET. D. Patients states no issues. Sates he has been doing well since last appointment. Continues use of Flonase daily     Past Medical History:   Diagnosis Date    Gout     Kidney stone      Past Surgical History:   Procedure Laterality Date    LITHOTRIPSY       History reviewed. No pertinent family history. Social History     Socioeconomic History    Marital status: Single     Spouse name: None    Number of children: None    Years of education: None    Highest education level: None   Tobacco Use    Smoking status: Never    Smokeless tobacco: Never   Substance and Sexual Activity    Alcohol use: Yes     Comment: weekly    Drug use: No     Allergies   Allergen Reactions    Latex     Sulfa Antibiotics      Swelling in throat         Review of Systems   Constitutional:  Negative for chills and fever. HENT:  Negative for congestion, ear discharge, ear pain, hearing loss, postnasal drip, rhinorrhea, sinus pressure, sneezing, sore throat and tinnitus. Eyes:  Negative for pain and discharge. Respiratory:  Negative for cough and shortness of breath. Cardiovascular:  Negative for chest pain. Gastrointestinal:  Negative for diarrhea and vomiting. Genitourinary:  Negative for flank pain. Musculoskeletal:  Negative for back pain and neck pain. Skin:  Negative for rash. Allergic/Immunologic: Negative. Neurological:  Negative for syncope and headaches. All other systems reviewed and are negative. Objective: There were no vitals filed for this visit. Physical Exam  Vitals reviewed. Constitutional:       Appearance: Normal appearance. HENT:      Head: Normocephalic and atraumatic. Jaw: There is normal jaw occlusion. No tenderness.

## 2024-03-15 ENCOUNTER — TELEPHONE (OUTPATIENT)
Dept: ENT CLINIC | Age: 54
End: 2024-03-15

## 2024-03-15 NOTE — TELEPHONE ENCOUNTER
LVM for pt in regards to Flonase recall. Suggested pt to his pharmacy and request new Flonase or get reimbursed for it.

## 2024-09-04 ENCOUNTER — HOSPITAL ENCOUNTER (OUTPATIENT)
Dept: GENERAL RADIOLOGY | Age: 54
Discharge: HOME | End: 2024-09-06
Payer: COMMERCIAL

## 2024-09-04 ENCOUNTER — HOSPITAL ENCOUNTER (OUTPATIENT)
Age: 54
Discharge: HOME | End: 2024-09-06
Payer: COMMERCIAL

## 2024-09-04 DIAGNOSIS — N20.0 CALCULUS OF KIDNEY: ICD-10-CM

## 2024-09-04 PROCEDURE — 74018 RADEX ABDOMEN 1 VIEW: CPT

## 2025-01-20 ENCOUNTER — OFFICE VISIT (OUTPATIENT)
Dept: ENT CLINIC | Age: 55
End: 2025-01-20
Payer: COMMERCIAL

## 2025-01-20 VITALS
OXYGEN SATURATION: 93 % | HEIGHT: 71 IN | SYSTOLIC BLOOD PRESSURE: 128 MMHG | TEMPERATURE: 97.2 F | DIASTOLIC BLOOD PRESSURE: 89 MMHG | BODY MASS INDEX: 30.38 KG/M2 | WEIGHT: 217 LBS | HEART RATE: 67 BPM

## 2025-01-20 DIAGNOSIS — H69.92 ETD (EUSTACHIAN TUBE DYSFUNCTION), LEFT: Primary | ICD-10-CM

## 2025-01-20 PROCEDURE — 99213 OFFICE O/P EST LOW 20 MIN: CPT

## 2025-01-20 RX ORDER — LORATADINE 10 MG/1
10 CAPSULE, LIQUID FILLED ORAL DAILY
Qty: 90 CAPSULE | Refills: 3 | Status: SHIPPED | OUTPATIENT
Start: 2025-01-20

## 2025-01-20 RX ORDER — FLUTICASONE PROPIONATE 50 MCG
2 SPRAY, SUSPENSION (ML) NASAL DAILY
Qty: 48 G | Refills: 3 | Status: SHIPPED | OUTPATIENT
Start: 2025-01-20

## 2025-01-20 ASSESSMENT — ENCOUNTER SYMPTOMS
SINUS PRESSURE: 0
COUGH: 0
DIARRHEA: 0
EYE DISCHARGE: 0
VOMITING: 0
SORE THROAT: 0
ALLERGIC/IMMUNOLOGIC NEGATIVE: 1
BACK PAIN: 0
SHORTNESS OF BREATH: 0
RHINORRHEA: 0
EYE PAIN: 0

## 2025-01-20 NOTE — PROGRESS NOTES
need to restart his Claritin.  No tympanogram was performed as the patient is not having issues at this time.  I would like him to continue his Flonase 2 sprays to each nostril once daily and Claritin 10 mg by mouth daily.  Patient was offered follow-up on an as-needed basis but did request follow-up in 1 year.  He was instructed to call the office for any new or worsening symptoms prior to his next appointment.    Follow up in 1 year(s)    RX given today:  claritin and Flonase    Raul Willoughby MSN, FNP-BC  [facility]    The information contained in this note has been dictated using drug and medical speech recognition software and may contain errors

## 2025-06-09 PROCEDURE — 99283 EMERGENCY DEPT VISIT LOW MDM: CPT

## 2025-06-10 ENCOUNTER — APPOINTMENT (OUTPATIENT)
Dept: GENERAL RADIOLOGY | Age: 55
End: 2025-06-10
Payer: COMMERCIAL

## 2025-06-10 ENCOUNTER — HOSPITAL ENCOUNTER (EMERGENCY)
Age: 55
Discharge: HOME | End: 2025-06-10
Attending: EMERGENCY MEDICINE
Payer: COMMERCIAL

## 2025-06-10 VITALS
HEART RATE: 108 BPM | RESPIRATION RATE: 24 BRPM | OXYGEN SATURATION: 95 % | DIASTOLIC BLOOD PRESSURE: 81 MMHG | TEMPERATURE: 99.9 F | BODY MASS INDEX: 27.89 KG/M2 | SYSTOLIC BLOOD PRESSURE: 143 MMHG | WEIGHT: 200 LBS

## 2025-06-10 DIAGNOSIS — M25.571 RIGHT ANKLE PAIN, UNSPECIFIED CHRONICITY: ICD-10-CM

## 2025-06-10 DIAGNOSIS — S93.401A SPRAIN OF RIGHT ANKLE, UNSPECIFIED LIGAMENT, INITIAL ENCOUNTER: Primary | ICD-10-CM

## 2025-06-10 PROCEDURE — 6370000000 HC RX 637 (ALT 250 FOR IP): Performed by: EMERGENCY MEDICINE

## 2025-06-10 PROCEDURE — 73610 X-RAY EXAM OF ANKLE: CPT

## 2025-06-10 RX ORDER — IBUPROFEN 800 MG/1
800 TABLET, FILM COATED ORAL ONCE
Status: COMPLETED | OUTPATIENT
Start: 2025-06-10 | End: 2025-06-10

## 2025-06-10 RX ADMIN — IBUPROFEN 800 MG: 800 TABLET, FILM COATED ORAL at 03:53

## 2025-06-10 ASSESSMENT — PAIN DESCRIPTION - LOCATION
LOCATION: ANKLE

## 2025-06-10 ASSESSMENT — PAIN DESCRIPTION - DESCRIPTORS
DESCRIPTORS: ACHING;THROBBING
DESCRIPTORS: ACHING;THROBBING
DESCRIPTORS: ACHING;DISCOMFORT;TENDER

## 2025-06-10 ASSESSMENT — PAIN DESCRIPTION - ORIENTATION
ORIENTATION: RIGHT

## 2025-06-10 ASSESSMENT — PAIN SCALES - GENERAL
PAINLEVEL_OUTOF10: 4
PAINLEVEL_OUTOF10: 5
PAINLEVEL_OUTOF10: 6

## 2025-06-10 ASSESSMENT — PAIN - FUNCTIONAL ASSESSMENT: PAIN_FUNCTIONAL_ASSESSMENT: 0-10

## 2025-06-10 NOTE — DISCHARGE INSTRUCTIONS
You have chronic changes in your right ankle and need to follow-up with orthopedics and or podiatry as soon as possible weightbearing as tolerated and also could be gout you should get an MRI of your ankle as soon as possible

## 2025-06-10 NOTE — ED PROVIDER NOTES
HPI:  6/10/25,   Time: 3:43 AM EDT         Daniel Alarcon is a 55 y.o. male presenting to the ED for right ankle pain, beginning hours today ago.  The complaint has been persistent, moderate in severity, and worsened by nothing.  No specific trauma patient does have a history of gout I told him he should get see an orthopedist or podiatrist and get an MRI or even aspiration of the ankle to confirm gout does not appear septic there is no severe warmth but he does know he needs close follow-up    ROS:   Pertinent positives and negatives are stated within HPI, all other systems reviewed and are negative.  --------------------------------------------- PAST HISTORY ---------------------------------------------  Past Medical History:  has a past medical history of Gout and Kidney stone.    Past Surgical History:  has a past surgical history that includes Lithotripsy.    Social History:  reports that he has never smoked. He has never used smokeless tobacco. He reports current alcohol use. He reports that he does not use drugs.    Family History: family history is not on file.     The patient’s home medications have been reviewed.    Allergies: Latex and Sulfa antibiotics    -------------------------------------------------- RESULTS -------------------------------------------------  All laboratory and radiology results have been personally reviewed by myself   LABS:  No results found for this visit on 06/10/25.    RADIOLOGY:  Interpreted by Radiologist.  XR ANKLE RIGHT (MIN 3 VIEWS)   Final Result   1. No fracture or dislocation.   2. Osteo chondroma versus synchondrosis formation between the distal tib fib   meta diaphyses, could be a source for pain. Consider further non urgent   evaluation by MRI as clinically indicated.      RECOMMENDATION:   Consider non urgent MRI evaluation of the ankle.             ------------------------- NURSING NOTES AND VITALS REVIEWED ---------------------------   The nursing notes within the